# Patient Record
Sex: FEMALE | Race: WHITE | URBAN - METROPOLITAN AREA
[De-identification: names, ages, dates, MRNs, and addresses within clinical notes are randomized per-mention and may not be internally consistent; named-entity substitution may affect disease eponyms.]

---

## 2017-01-31 ENCOUNTER — PRE VISIT (OUTPATIENT)
Dept: UROLOGY | Facility: CLINIC | Age: 42
End: 2017-01-31

## 2017-02-09 ENCOUNTER — OFFICE VISIT (OUTPATIENT)
Dept: UROLOGY | Facility: CLINIC | Age: 42
End: 2017-02-09

## 2017-02-09 VITALS
DIASTOLIC BLOOD PRESSURE: 80 MMHG | HEART RATE: 76 BPM | WEIGHT: 147 LBS | HEIGHT: 68 IN | SYSTOLIC BLOOD PRESSURE: 127 MMHG | BODY MASS INDEX: 22.28 KG/M2

## 2017-02-09 DIAGNOSIS — R39.15 URINARY URGENCY: ICD-10-CM

## 2017-02-09 DIAGNOSIS — N39.41 URGENCY INCONTINENCE: ICD-10-CM

## 2017-02-09 DIAGNOSIS — M62.89 PELVIC FLOOR DYSFUNCTION: Primary | ICD-10-CM

## 2017-02-09 DIAGNOSIS — Z98.891 H/O: CESAREAN SECTION: ICD-10-CM

## 2017-02-09 LAB
APPEARANCE UR: CLEAR
BILIRUB UR QL: NORMAL
COLOR UR: YELLOW
GLUCOSE URINE: NORMAL MG/DL
HGB UR QL: NORMAL
KETONES UR QL: NORMAL MG/DL
LEUKOCYTE ESTERASE URINE: NORMAL
NITRITE UR QL STRIP: NORMAL
PH UR STRIP: 5 PH (ref 5–7)
PROTEIN ALBUMIN URINE: NORMAL MG/DL
SOURCE: NORMAL
SP GR UR STRIP: 1 (ref 1–1.03)
UROBILINOGEN UR QL STRIP: 0.2 EU/DL (ref 0.2–1)

## 2017-02-09 ASSESSMENT — PAIN SCALES - GENERAL: PAINLEVEL: NO PAIN (0)

## 2017-02-09 NOTE — LETTER
"2/9/2017       RE: Maia Johns  [address] [place] [address]     Dear Colleague,    Thank you for referring your patient, Maia Johns, to the [facility] UROLOGY AND INST FOR PROSTATE AND UROLOGIC CANCERS at [facility]. Please see a copy of my visit note below.    February 9, 2017    Return visit    Patient returns today for follow up.  She reports that she has been doing really well since last visit.  Actually did not feel any symptoms until she realized that she had a follow up appointment with us.  She is concerned that the symptoms are \"all in her head.\"  She states that she took some azo which took the edge off and then states that having vaginal intercourse helped the symptoms.  She is also seeing a functional therapist who wonders if tipped.  No infections or hematuria. She denies any changes in her health since last visit.    /80 mmHg  Pulse 76  Ht 1.727 m (5' 8\")  Wt 66.679 kg (147 lb)  BMI 22.36 kg/m2  She is comfortable, in no distress, non-labored breathing.  Abdomen soft, non-tender, non-distended, well healed scar (has had 4 c-sections)    A/P: 41 year old F with urinary urgency, occasional urgency incontinence, pelvic floor dysfunction    We discussed how her pelvic floor symptoms are related to the physical exam findings and her pelvic floor myofascial dysfunction.  We discussed how her symptoms are real and sound to be musculoskeletal in nature and not \"in her head.\" We discussed how the recommended treatment is dedicated pelvic floor therapy.  We discussed how the pelvic floor physical therapy works and patient is agreeable.  Referral was placed.      She will contact us if she thinks she has an onfection    RTC  6 months, sooner if needed    15 minutes were spent with the patient today, > 50% in counseling and coordination of care    Lucía Pierre MD MPH   of Urology    CC  Patient Care Team:  Vladimir Forde MD as PCP - " General  SELF, REFERRED

## 2017-02-09 NOTE — MR AVS SNAPSHOT
After Visit Summary   2017    Maia Johns    MRN: [id number]           Patient Information     Date Of Birth          1975        Visit Information        Provider Department      2017 12:15 PM Lucía Pierre MD [facility] Urology and [facility] for [facility] Urologic C[facility]        Today's Diagnoses     Pelvic floor dysfunction    -  1     Urinary urgency         H/O:  section         Urgency incontinence           Care Instructions    Please let us know if you think you have an infection    Please see one of the dedicated pelvic floor physical therapist ([facility] [phone])  Romy office ([facility])    Please return to see me in 6 months, sooner if needed    It was a pleasure meeting with you today.  Thank you for allowing me and my team the privilege of caring for you today.  YOU are the reason we are here, and I truly hope we provided you with the excellent service you deserve.  Please let us know if there is anything else we can do for you so that we can be sure you are leaving completely satisfied with your care experience.            Follow-ups after your visit        Additional Services     [facility] Physical Therapy Referral       **This order will print in the [facility] Scheduling Office**    Physical Therapy, Hand Therapy and Chiropractic Care are available through:    *[facility]  *[facility]  *[facility]    Call one number to schedule at any of the above locations: [phone].    Your provider has referred you to: Physical Therapy at [facility] or [facility]    Indication/Reason for Referral: Women's Health (Please Complete Special Programs SmartList)  Onset of Illness: years  Therapy Orders: Evaluate and Treat  Special Programs: None and Women's Health: Pelvic Dysfunction: urinary urgency, urge incontinence, pelvic floor dysfunction  Pelvic Floor Weakness: urgency  incontinence, history of 4 c-sections and  Biofeedback, E-Stim/TENS/TENS Rental if deemed appropriate by therapist, Exercise/HEP and Myofascial Release/Massage (internal)  Special Request: Exercise: Home Exercise Program  Manual Therapy: Myofascial Release/Massage (internal)  Modalities: As Indicated E-Stim/TENS    Ana Maria Rodgers      Additional Comments for the Therapist or Chiropractor: No sharon please.  Core strengthening    Please be aware that coverage of these services is subject to the terms and limitations of your health insurance plan.  Call member services at your health plan with any benefit or coverage questions.      Please bring the following to your appointment:    *Your personal calendar for scheduling future appointments  *Comfortable clothing                  Who to contact     Please call your clinic at [phone] to:    Ask questions about your health    Make or cancel appointments    Discuss your medicines    Learn about your test results    Speak to your doctor   If you have compliments or concerns about an experience at your clinic, or if you wish to file a complaint, please contact [facility] Patient Relations at [phone] or email us at [email or web address]         Additional Information About Your Visit        TicketLeap Information     Surviost is an electronic gateway that provides easy, online access to your medical records. With TicketLeap, you can request a clinic appointment, read your test results, renew a prescription or communicate with your care team.     To sign up for Surviost visit the website at [email or web address]   You will be asked to enter the access code listed below, as well as some personal information. Please follow the directions to create your username and password.     Your access code is: [email or web address]  Expires: 2017  6:30 AM     Your access code will  in 90 days. If you need help or a new code, please contact  "your [facility] or call [phone] for assistance.        Care EveryWhere ID     This is your Care EveryWhere ID. This could be used by other organizations to access your [facility] medical records  [id number]        Your Vitals Were     Pulse Height BMI (Body Mass Index)             76 1.727 m (5' 8\") 22.36 kg/m2          Blood Pressure from Last 3 Encounters:   02/09/17 127/80   10/20/16 133/86   09/08/16 125/81    Weight from Last 3 Encounters:   02/09/17 66.679 kg (147 lb)   10/20/16 68.947 kg (152 lb)   03/25/13 83.462 kg (184 lb)              We Performed the Following     [facility] Physical Therapy Referral        Primary Care Provider Office Phone # Fax #    Vladimir Forde [phone]       [facility] [address]   [place] [address]        Thank you!     Thank you for choosing [facility] UROLOGY AND [facility] FOR PROSTATE AND UROLOGIC CANCERS  for your care. Our goal is always to provide you with excellent care. Hearing back from our patients is one way we can continue to improve our services. Please take a few minutes to complete the written survey that you may receive in the mail after your visit with us. Thank you!             Your Updated Medication List - Protect others around you: Learn how to safely use, store and throw away your medicines at www.disposemymeds.org.          This list is accurate as of: 2/9/17 12:38 PM.  Always use your most recent med list.                   Brand Name Dispense Instructions for use    clobetasol 0.05 % ointment    TEMOVATE         diphenhydrAMINE 25 MG capsule    BENADRYL     Take 25 mg by mouth       doxycycline Monohydrate 100 MG Caps          * MACROBID PO          * MACROBID 100 MG capsule   Generic drug:  nitrofurantoin (macrocrystal-monohydrate)      Take 100 mg by mouth       * PRENATAL VITAMIN PO          * PRENATAL VITAMIN PO          * Notice:  This list has 4 medication(s) that are the same as " other medications prescribed for you. Read the directions carefully, and ask your doctor or other care provider to review them with you.

## 2017-02-09 NOTE — PATIENT INSTRUCTIONS
Please let us know if you think you have an infection    Please see one of the dedicated pelvic floor physical therapist ([facility] [phone])  R[place] office ([facility])    Please return to see me in 6 months, sooner if needed    It was a pleasure meeting with you today.  Thank you for allowing me and my team the privilege of caring for you today.  YOU are the reason we are here, and I truly hope we provided you with the excellent service you deserve.  Please let us know if there is anything else we can do for you so that we can be sure you are leaving completely satisfied with your care experience.

## 2017-02-09 NOTE — PROGRESS NOTES
"February 9, 2017    Return visit    Patient returns today for follow up.  She reports that she has been doing really well since last visit.  Actually did not feel any symptoms until she realized that she had a follow up appointment with us.  She is concerned that the symptoms are \"all in her head.\"  She states that she took some azo which took the edge off and then states that having vaginal intercourse helped the symptoms.  She is also seeing a functional therapist who wonders if tipped.  No infections or hematuria. She denies any changes in her health since last visit.    /80 mmHg  Pulse 76  Ht 1.727 m (5' 8\")  Wt 66.679 kg (147 lb)  BMI 22.36 kg/m2  She is comfortable, in no distress, non-labored breathing.  Abdomen soft, non-tender, non-distended, well healed scar (has had 4 c-sections)    A/P: 41 year old F with urinary urgency, occasional urgency incontinence, pelvic floor dysfunction    We discussed how her pelvic floor symptoms are related to the physical exam findings and her pelvic floor myofascial dysfunction.  We discussed how her symptoms are real and sound to be musculoskeletal in nature and not \"in her head.\" We discussed how the recommended treatment is dedicated pelvic floor therapy.  We discussed how the pelvic floor physical therapy works and patient is agreeable.  Referral was placed.      She will contact us if she thinks she has an onfection    RTC  6 months, sooner if needed    15 minutes were spent with the patient today, > 50% in counseling and coordination of care    Lucía Pierre MD MPH   of Urology    CC  Patient Care Team:  Vladimir Forde MD as PCP - General  SELF, REFERRED              "

## 2017-02-24 ENCOUNTER — THERAPY VISIT (OUTPATIENT)
Dept: PHYSICAL THERAPY | Facility: CLINIC | Age: 42
End: 2017-02-24
Payer: COMMERCIAL

## 2017-02-24 DIAGNOSIS — N94.10 DYSPAREUNIA IN FEMALE: ICD-10-CM

## 2017-02-24 DIAGNOSIS — R39.15 URINARY URGENCY: Primary | ICD-10-CM

## 2017-02-24 PROCEDURE — 97112 NEUROMUSCULAR REEDUCATION: CPT | Mod: GP | Performed by: PHYSICAL THERAPIST

## 2017-02-24 PROCEDURE — 97161 PT EVAL LOW COMPLEX 20 MIN: CPT | Mod: GP | Performed by: PHYSICAL THERAPIST

## 2017-02-24 PROCEDURE — 97140 MANUAL THERAPY 1/> REGIONS: CPT | Mod: GP | Performed by: PHYSICAL THERAPIST

## 2017-02-24 PROCEDURE — 97535 SELF CARE MNGMENT TRAINING: CPT | Mod: GP | Performed by: PHYSICAL THERAPIST

## 2017-02-24 NOTE — PROGRESS NOTES
"Subjective:  [facility] Initial Evaluation    History of current episode:    Onset date/MD order: 2/9/2017 (symptoms x 4yrs after birth of last daughter).    CC/Present symptoms: Urinary urgency and frequency; unable to intercourse 2 nights in a row due to pain; always feels she is on the verge of a UTI.  Pain rating (0-10): 1/10  Conditioning is improving/unchanging/worsening: worsening    Pelvic/Abdominal Surgeries:csectionx4, septate uterus  Sexually active:yes  Hx of or present sexually transmitted disease:no  SMHx: Antibitotics for UTI x 2 yrs after last delivery.  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  Tried Azo without help.  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 Yes   Do you leak with cough,sneeze, jumping, running?No   Any other activities that cause leaking? No   Do you have triggers that make you feel you can't wait to go to the bathroom? Yes What are they? Buckling car seats, packing/unpacking car.  Type of pad and number used per day? 0  When you leak what is the amount? small  How long can you delay the need to urinate? Not at all when symptomatic  Do you feel excessive pressure in pelvic floor:no.  When? na  Frequency of daytime urination:1x/hr until 12 pm then 4-5 times after  Frequency of nighttime urination:1  Can you stop the flow of urine when on the toilet? No when symptomatic  Is the volume of urine passed usually: large. (8sec rule= 250ml with average bladder storing 400-600ml)  Do you strain to pass urine? Yes  Do you have a slow or hesitant urinary stream? No  Do you have difficulty initiating the urine stream? Yes  Is urination painful? Yes- after intercourse  How many bladder infections have you had " in last 12 months?symptoms 2-3 times a week without evidence of UTI present  Fluid intake(one glass is 8oz or one cup) 10 glasses/day, 4 caffinated glasses/day  1 alcohol glasses/day.  Bowel habits:  Frequency of bowel movements? 1-2 times a day  Consistancy of stool? soft formed, Parke Stool Scale 4  Do you ignore the urge to defecate? No  Do you strain to pass stool? No  Pelvic Pain:  Do you have any pelvic pain with intercourse, exams, use of tampons? Yes  Is initial penetration during intercourse painful? Yes  Is deeper penetration painful? Yes  Do you use lubricant? Yes What kind? [place]  Are you sexually active?Yes  Have you ever been worried for your physical safety? No  Have you practiced the PF(kegel) exercises for 4 or more weeks?no  Marinoff Scale:Level 2  (Level 3: Abstinence from intercourse because of severe pain. Level 2: Painful intercourse which limites frequency of activity. Level 1: Painful intercourse not severe enough to prevent activity.)    Treatment/Education provided this session (see flow sheet for additional information):    Self Care Management/Patient education (12 min): Today's session consisted of education regarding pelvic floor muscle anatomy, normal bladder function, urge suppression techniques and/or relaxation techniques as indicated, and instruction in how to complete a bladder diary for assessment next visit. Depending on patient presentation, timed voids, double voids, and proper fluid/fiber intake discussed. Pt was instructed in the pathoanatomy of the pelvic floor utilizing pelvic model.  We discussed what pelvic floor physical therapy is, components of exam, and typical patient progression. Prior to internal PFM exam,  patient was told that they were in control of exam progression, and if at any time were uncomfortable and wished to discontinue we would.          NMR pelvic pain (12 min):  Pt education regarding contributing factors to pelvic pain and dysfunction related to  overactivity in the pelvic floor.  Included resources for relaxed awareness and pelvic floor quieting techniqes.  Extra time spent describing pelvic floor muscle exam and treatment plan/goals, with attention to potential history that may contribute to current symptoms.  Included resources for home release techniques using dilators and/or thera wand as indicated.  Recommended partner involvement if available.  Discussed in detail potential physiological and behavioral components of pelvic pain.  Demonstrated/performed techniques for input to painful area while using visualization and relaxation.                    HPI                    Objective:    System                                 Pelvic Dysfunction Evaluation:      Diagnostic Tests:    Pelvic Exam:  2/9  UA/Urine Sample:  Clear        Cystoscopy:  Irritated bladder            Flexibility:    Tightness present at:Iliopsoas    Abdominal Wall:    Diastasis Recti:  Normal    Scar Mobility:  C scar left lateral dec mobility, tenderness  Pelvic Clock Exam:  Pelvic clock exam: left.      Transverse Perineal:  +++  Levator ANI:  +++    SI Provocation:  normal          External Assessment:    Skin Condition:  Normal  Scars:  Well healed  Bearing Down/Coughing:  Normal    Introitus:  Normal  Muscle Contraction/Perineal Mobility:  Slight lift, no urogential triangle descent and substitution  Internal Assessment:  Internal assessment pelvic: Pain +++ left OI, LA.  Sensory Exam:  Normal  Contraction/Grade:  Fllicker muscles stretched (1)    Accessory Muscle use-Gluteals:  Yes  Accessory Muscle use-Adductors:  Yes  Symmetry of Contraction Response:  Poor NMC TrA and PFM  SEMG Biofeedback:      Surface electrode placement--Abdominals: yes  Suraface electrode placement--Perianal:  Yes  Baseline EMG PM:  1.1 mV (pt asymptomatic with UTI currently)    Peak pelvic muscle contraction:  3 mV  Sustained contraction:  1-2  EMG interpretation to fatigue:  Less than3  seconds  Position:  SupineAdditional History:  Delivery History:    Number of Pregnancies: 4  Number of Live Births: 4 (last  4 yrs ago); 3 cerclages in 16 wks            Hip Evaluation  HIP AROM:  AROM:   Left Hip:     Normal    Right Hip:                    Hip PROM:  Hip PROM:  Left Hip:   Normal  Right Hip:                             Hip Special Testing:      Left hip negative for the following special tests:  Armando or Fadir/Labrum                   General     ROS    Assessment/Plan:      Patient is a 41 year old female with pelvic complaints.    Patient has the following significant findings with corresponding treatment plan.                Diagnosis 1:  Urinary Urgency  Pain -  electric stimulation, manual therapy, self management, education and home program  Impaired muscle performance - neuro re-education  Decreased function - therapeutic activities  Diagnosis 2:  Dyspareunia   Pain -  electric stimulation, manual therapy, self management, education and home program  Impaired muscle performance - biofeedback and neuro re-education  Decreased function - therapeutic activities    Therapy Evaluation Codes:   1) History comprised of:   Personal factors that impact the plan of care:      None.    Comorbidity factors that impact the plan of care are:      None.     Medications impacting care: None.  2) Examination of Body Systems comprised of:   Body structures and functions that impact the plan of care:      Pelvis.   Activity limitations that impact the plan of care are:      Haines and Urgency.  3) Clinical presentation characteristics are:   Stable/Uncomplicated.  4) Decision-Making    Low complexity using standardized patient assessment instrument and/or measureable assessment of functional outcome.  Cumulative Therapy Evaluation is: Low complexity.    Previous and current functional limitations:  (See Goal Flow Sheet for this information)    Short term and Long term goals: (See Goal  Flow Sheet for this information)     Communication ability:  Patient appears to be able to clearly communicate and understand verbal and written communication and follow directions correctly.  Treatment Explanation - The following has been discussed with the patient:   RX ordered/plan of care  Anticipated outcomes  Possible risks and side effects  This patient would benefit from PT intervention to resume normal activities.   Rehab potential is good.    Frequency:  1 X week, once daily  Duration:  for 8 weeks  Discharge Plan:  Achieve all LTG.  Independent in home treatment program.  Reach maximal therapeutic benefit.    Please refer to the daily flowsheet for treatment today, total treatment time and time spent performing 1:1 timed codes.

## 2017-02-26 PROBLEM — N94.10 DYSPAREUNIA IN FEMALE: Status: ACTIVE | Noted: 2017-02-26

## 2017-02-26 PROBLEM — R39.15 URINARY URGENCY: Status: ACTIVE | Noted: 2017-02-26

## 2017-02-27 NOTE — PROGRESS NOTES
Subjective:                                       Pertinent medical history includes:  None and migraines.  Medical allergies: yes (penicillin).  Other surgeries include:  Other.  Current medications:  None as reported by patient.  Current occupation is None .    Primary job tasks include:  Prolonged standing and lifting.    Barriers include:  None as reported by patient.    Red flags:  Bilateral numbness.                      Objective:    System    Physical Exam    General     ROS    Assessment/Plan:

## 2017-03-06 ENCOUNTER — THERAPY VISIT (OUTPATIENT)
Dept: PHYSICAL THERAPY | Facility: CLINIC | Age: 42
End: 2017-03-06
Payer: COMMERCIAL

## 2017-03-06 DIAGNOSIS — N94.10 DYSPAREUNIA IN FEMALE: ICD-10-CM

## 2017-03-06 DIAGNOSIS — R39.15 URINARY URGENCY: Primary | ICD-10-CM

## 2017-03-06 PROCEDURE — 97110 THERAPEUTIC EXERCISES: CPT | Mod: GP | Performed by: PHYSICAL THERAPIST

## 2017-03-06 PROCEDURE — 97140 MANUAL THERAPY 1/> REGIONS: CPT | Mod: GP | Performed by: PHYSICAL THERAPIST
